# Patient Record
Sex: FEMALE | Employment: STUDENT | URBAN - METROPOLITAN AREA
[De-identification: names, ages, dates, MRNs, and addresses within clinical notes are randomized per-mention and may not be internally consistent; named-entity substitution may affect disease eponyms.]

---
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
## 2025-06-09 ENCOUNTER — TELEPHONE (OUTPATIENT)
Age: 20
End: 2025-06-09

## 2025-06-09 NOTE — TELEPHONE ENCOUNTER
Pt calling back in because she didn't hear back yet; tried calling clerical and clinical to confirm but unable to reach anybody. Please advise.

## 2025-06-09 NOTE — TELEPHONE ENCOUNTER
Patient is scheduled as a new patient with Dr. Plascencia for tomorrow. Patient had her wisdom teeth removed. Now has an infection in her mouth and she broke out with rashes. She is wondering if can still come, she is already on antibiotics for the infection.    Please call to advise accordingly.  Thank you!!

## 2025-06-10 ENCOUNTER — OFFICE VISIT (OUTPATIENT)
Dept: FAMILY MEDICINE CLINIC | Facility: HOSPITAL | Age: 20
End: 2025-06-10
Payer: COMMERCIAL

## 2025-06-10 VITALS
BODY MASS INDEX: 25.13 KG/M2 | SYSTOLIC BLOOD PRESSURE: 108 MMHG | WEIGHT: 128 LBS | DIASTOLIC BLOOD PRESSURE: 80 MMHG | TEMPERATURE: 98.4 F | HEART RATE: 83 BPM | OXYGEN SATURATION: 99 % | HEIGHT: 60 IN

## 2025-06-10 DIAGNOSIS — R21 RASH: Primary | ICD-10-CM

## 2025-06-10 PROCEDURE — 99203 OFFICE O/P NEW LOW 30 MIN: CPT

## 2025-06-10 RX ORDER — CHLORHEXIDINE GLUCONATE ORAL RINSE 1.2 MG/ML
SOLUTION DENTAL
COMMUNITY
Start: 2025-06-03

## 2025-06-10 RX ORDER — CETIRIZINE HYDROCHLORIDE 5 MG/1
5 TABLET ORAL DAILY
Qty: 30 TABLET | Refills: 0 | Status: SHIPPED | OUTPATIENT
Start: 2025-06-10 | End: 2025-06-17

## 2025-06-10 RX ORDER — HYDROCORTISONE 25 MG/G
CREAM TOPICAL 2 TIMES DAILY
Qty: 20 G | Refills: 0 | Status: SHIPPED | OUTPATIENT
Start: 2025-06-10 | End: 2025-06-17

## 2025-06-10 RX ORDER — AMOXICILLIN 500 MG/1
500 CAPSULE ORAL 3 TIMES DAILY
COMMUNITY
Start: 2025-05-16

## 2025-06-10 NOTE — PATIENT INSTRUCTIONS
"Patient Education     Eczema (atopic dermatitis)   The Basics   Written by the doctors and editors at [facility]   What is eczema? -- Eczema is a skin condition that makes your skin itchy and flaky. Doctors do not know what causes it. Eczema often happens in people who have allergies. It can also run in families. Another term for eczema is \"atopic dermatitis.\"  What are the symptoms of eczema? -- The symptoms of eczema can include:   Intense itching   Color changes - In people with light skin, areas with eczema might look red or pink. In people with dark skin, they might appear dark brown, purple, or gray. Sometimes, there is a patch of skin that looks lighter than the skin around it.   Small bumps - These might look like dots or goosebumps (picture 1).   Skin that flakes off or forms scales (picture 2)  Most people with eczema have their first symptoms before they turn 5. But eczema can look different in people of different ages:   In babies and children younger than 2 years old, eczema tends to affect the front of the arms and legs, cheeks, or scalp (picture 3). (The diaper area is not usually affected.)   In older children andadults, eczema often affects the sides of the neck, the elbow creases, and the backs of the knees (picture 4). Adults can also get it on their wrists, hands, forearms, and face (picture 5).   In older children and adults, the skin can become thicker over time (picture 6), and can even form scars from too much scratching.  Is there a test for eczema? -- No, there is no test. But doctors and nurses can tell if you have eczema by looking at your skin and by asking you questions.  What can I do to reduce my symptoms? -- You can use unscented, thick moisturizing creams and ointments to keep the skin from getting too dry.  If possible, try to avoid or limit things that can make eczema worse. These include:   Being too hot or sweating too much   Being in very dry air   Stress or worry   Sudden " "temperature changes   Harsh soaps or cleaning products   Perfumes   Wool or synthetic fabrics (like polyester)  How is eczema treated? -- There are treatments that can relieve the symptoms of eczema. But the condition cannot be cured. Even so, about half of children with eczema grow out of it by the time they become adults. Treatments for eczema include:   Moisturizing creams or ointments - These products help keep your skin moist. In some cases, your doctor or nurse might suggest using a moist dressing over special creams or medicines. It helps to put on your cream or ointment right after a bath or shower. Some people also try products that you put in the bathtub, such as oil or oatmeal. But these have been found not to help with eczema symptoms.   Steroid creams and ointments - These can help with itching and swelling. In severe cases, you might need steroids in pills. But your doctor or nurse will want you to stop taking steroid pills as soon as possible. Even though these medicines help, they can also cause problems of their own.   Antihistamine pills - Antihistamines are medicines that people often take for allergies. Some people with eczema find that antihistamines relieve itching. Others do not think that the medicines help with itching. Many people with eczema find that itching is worst at night. That can make it hard to sleep. If you have this problem, talk with your doctor or nurse about it. They might recommend an antihistamine that can also help you sleep.   Light therapy - Another treatment option is something called \"light therapy,\" but doctors do not use it much. During light therapy, your skin is exposed to a special kind of light called ultraviolet light. This therapy is usually done in a doctor's office.  Doctors usually recommend light therapy for people who do not get better with other treatments.   Medicines that change the way that the immune system works - These medicines are only for people " "who do not get better with moisturizers and steroid creams or ointments.  Can eczema be prevented? -- Experts don't know if there is a way to prevent eczema. Babies who have a parent or sibling with eczema have a higher risk of getting it. For these babies, good skin care might be helpful, especially in cold or dry areas. Good skin care includes using moisturizing creams or ointments. But doctors don't yet know if this actually helps prevent eczema from happening later.  If you use cream or ointment on your , wash your hands first. This helps lower the risk of getting germs on the baby's skin that could cause infection. Try to use products that come in a tube instead of a jar that you dip your fingers in.  When should I call the doctor? -- Call for emergency help right away (in the [place] and [place], call ) if:   You have signs of a very bad allergic reaction called \"anaphylaxis.\" These include:   Hives - Raised, inflamed, red patches of skin that are very itchy.   Angioedema - A condition that causes puffiness, usually of the face, eyelids, ears, mouth, hands, or feet.   Redness or itching of the skin on most of the body (without hives)   Swelling or itching of the eyes   Runny nose or swelling of the tongue   Trouble breathing, wheezing, or voice changes   Vomiting or diarrhea   Feeling dizzy or passing out  Call for advice if:   You have signs of an infection - These include a fever of 100.4°F (38°C) or higher, or chills.   Your eczema is making you feel anxious or depressed - There are treatments that can help with this.   You have trouble sleeping because you are itching.   Your eczema:   Has pus or yellow scabs on it   Gets worse or is covering most of your body   Is on your eyes or lips, or if you notice a rash or blisters in your mouth   Gets worse after you were around someone with cold sores or fever blisters  All topics are updated as new evidence becomes available and our peer review process is " complete.  This topic retrieved from [employer] on: Feb 26, 2024.  Topic 29467 Version 19.0  Release: 32.2.4 - C32.56  © 2024 UpToDate, Inc. and/or its affiliates. All rights reserved.  picture 1: Eczema     Eczema sometimes looks like scaly bumps on the skin.  Graphic 087769 Version 1.0  picture 2: Dry skin in eczema     This child has dry skin from eczema on their chest and arms.  Graphic 249353 Version 1.0  picture 3: Baby with eczema     This picture shows a baby with eczema on the cheeks and neck.  Graphic [id number] Version 2.0  picture 4: Child with eczema     This picture shows eczema on the back of a child's legs. In some areas, the skin has been damaged by repeated scratching.  Graphic [id number] Version 3.0  picture 5: Eczema affecting the eyelids     This picture shows a person with red, scaly skin from eczema on the eyelids.  Graphic [id number] Version 2.0  picture 6: Skin thickening in eczema     Over time, eczema can lead to thickening of the skin.  Graphic [id number] Version 1.0  Consumer Information Use and Disclaimer   Disclaimer: This generalized information is a limited summary of diagnosis, treatment, and/or medication information. It is not meant to be comprehensive and should be used as a tool to help the user understand and/or assess potential diagnostic and treatment options. It does NOT include all information about conditions, treatments, medications, side effects, or risks that may apply to a specific patient. It is not intended to be medical advice or a substitute for the medical advice, diagnosis, or treatment of a health care provider based on the health care provider's examination and assessment of a patient's specific and unique circumstances. Patients must speak with a health care provider for complete information about their health, medical questions, and treatment options, including any risks or benefits regarding use of medications. This information does not endorse any treatments or medications  as safe, effective, or approved for treating a specific patient. UpToDate, Inc. and its affiliates disclaim any warranty or liability relating to this information or the use thereof.The use of this information is governed by the Terms of Use, available at https://www.Checkd.Iner.com/en/know/clinical-effectiveness-terms. 2024© [employer] and its affiliates and/or licensors. All rights reserved.  Copyright   © 2024 UpToDate, Inc. and/or its affiliates. All rights reserved.

## 2025-06-10 NOTE — PROGRESS NOTES
Name: Vivian Carlson      : 2005      MRN: [id number]  Encounter Provider: Bharti Chowdary DO  Encounter Date: 6/10/2025   Encounter department: [facility] [address]  :  Assessment & Plan  Rash  -Suspect contact dermatitis or some other allergic reaction, less suspicion for PCN allergy  -D/t ongoing tx for tooth infx will hold off on PO steroids    Plan:  -Oral antihistamine, topical steroid  -Pt would also like derm referral  - F/u when possible for annual physical    Orders:    hydrocortisone 2.5 % cream; Apply topically 2 (two) times a day for 7 days    cetirizine (ZyrTEC) 5 MG tablet; Take 1 tablet (5 mg total) by mouth daily for 7 days    Ambulatory Referral to Dermatology; Future           History of Present Illness   Rash 1 week ago  Surgery 4 weeks ago  On multiple rounds of abx - amoxicillin multiple times  Pt reports was off abx when rash started  No particular events at that time  No contacts with same rash  No travel exposures    Rash started on chest, spread to stomach and arms  Pruritic at night  Denies having sensitive skin  Does have seasonal allergies  No change in body products  NKDA, some fruit allergies      Review of Systems   Skin:  Positive for rash.   Allergic/Immunologic: Positive for environmental allergies.       Objective   /80   Pulse 83   Temp 98.4 °F (36.9 °C) (Tympanic)   Ht 5' (1.524 m)   Wt 58.1 kg (128 lb)   SpO2 99%   BMI 25.00 kg/m²      Physical Exam  Constitutional:       Appearance: Normal appearance.   HENT:      Head: Normocephalic and atraumatic.      Mouth/Throat:      Pharynx: Oropharynx is clear.     Cardiovascular:      Rate and Rhythm: Normal rate and regular rhythm.      Heart sounds: Normal heart sounds.   Pulmonary:      Effort: Pulmonary effort is normal.      Breath sounds: Normal breath sounds.     Skin:     Findings: Erythema and rash present.      Comments: Hyperkeratotic changes on posterior  arms  Erythematous, diffuse rash anterior arms     Neurological:      Mental Status: She is alert.     Psychiatric:         Mood and Affect: Mood normal.         Behavior: Behavior normal.           Bharti Chowdary, DO  Family Medicine

## 2025-07-07 DIAGNOSIS — R21 RASH: ICD-10-CM

## 2025-07-08 RX ORDER — CETIRIZINE HYDROCHLORIDE 5 MG/1
5 TABLET ORAL DAILY
Qty: 30 TABLET | Refills: 0 | Status: SHIPPED | OUTPATIENT
Start: 2025-07-08 | End: 2025-07-15 | Stop reason: ALTCHOICE

## 2025-07-15 ENCOUNTER — OFFICE VISIT (OUTPATIENT)
Dept: FAMILY MEDICINE CLINIC | Facility: HOSPITAL | Age: 20
End: 2025-07-15
Payer: COMMERCIAL

## 2025-07-15 VITALS
BODY MASS INDEX: 25.36 KG/M2 | WEIGHT: 129.2 LBS | DIASTOLIC BLOOD PRESSURE: 80 MMHG | HEIGHT: 60 IN | SYSTOLIC BLOOD PRESSURE: 116 MMHG | OXYGEN SATURATION: 99 % | HEART RATE: 78 BPM

## 2025-07-15 DIAGNOSIS — Z00.00 ANNUAL PHYSICAL EXAM: Primary | ICD-10-CM

## 2025-07-15 PROCEDURE — 99395 PREV VISIT EST AGE 18-39: CPT
